# Patient Record
Sex: FEMALE
[De-identification: names, ages, dates, MRNs, and addresses within clinical notes are randomized per-mention and may not be internally consistent; named-entity substitution may affect disease eponyms.]

---

## 2022-04-05 PROBLEM — Z00.00 ENCOUNTER FOR PREVENTIVE HEALTH EXAMINATION: Status: ACTIVE | Noted: 2022-04-05

## 2022-04-08 ENCOUNTER — APPOINTMENT (OUTPATIENT)
Dept: PAIN MANAGEMENT | Facility: CLINIC | Age: 57
End: 2022-04-08
Payer: COMMERCIAL

## 2022-04-08 PROCEDURE — 99203 OFFICE O/P NEW LOW 30 MIN: CPT

## 2022-04-08 RX ORDER — GABAPENTIN 300 MG
300 TABLET ORAL
Refills: 0 | Status: ACTIVE | COMMUNITY

## 2022-04-08 RX ORDER — DICLOFENAC SODIUM 50 MG/1
50 TABLET, DELAYED RELEASE ORAL
Qty: 60 | Refills: 0 | Status: ACTIVE | COMMUNITY
Start: 2022-04-08 | End: 1900-01-01

## 2022-04-08 NOTE — PHYSICAL EXAM
[Normal Spine curvature] : normal spine curvature [Facet Tenderness] : facet tenderness [Paraspinal Tenderness] : paraspinal tenderness [Normal] : Gait: normal [UE/LE] : Sensory: Intact in bilateral upper & lower extremities [ALL] : dorsalis pedis, posterior tibial, femoral, popliteal, and radial 2+ and symmetric bilaterally [Spinous Process Tenderness] : no spinous process tenderness [Sacroiliac tenderness] : no sacroiliac tenderness [GreaterTrochanteric bursa tenderness] : no greater trochanteric bursa tenderness [Piriformis Tenderness] : no piriformis tenderness [Spurling] : negative Spurling's Test [SLR] : negative straight leg raise [de-identified] : Pain with extension , less with flexion

## 2022-05-11 ENCOUNTER — APPOINTMENT (OUTPATIENT)
Dept: PAIN MANAGEMENT | Facility: AMBULATORY MEDICAL SERVICES | Age: 57
End: 2022-05-11
Payer: COMMERCIAL

## 2022-05-11 PROCEDURE — 64494 INJ PARAVERT F JNT L/S 2 LEV: CPT

## 2022-05-11 PROCEDURE — 64493 INJ PARAVERT F JNT L/S 1 LEV: CPT

## 2022-05-12 ENCOUNTER — APPOINTMENT (OUTPATIENT)
Dept: PAIN MANAGEMENT | Facility: CLINIC | Age: 57
End: 2022-05-12
Payer: COMMERCIAL

## 2022-05-12 PROCEDURE — 99202 OFFICE O/P NEW SF 15 MIN: CPT | Mod: 95

## 2022-05-12 PROCEDURE — 99212 OFFICE O/P EST SF 10 MIN: CPT | Mod: 95

## 2022-05-25 ENCOUNTER — APPOINTMENT (OUTPATIENT)
Dept: PAIN MANAGEMENT | Facility: CLINIC | Age: 57
End: 2022-05-25
Payer: COMMERCIAL

## 2022-05-25 PROCEDURE — 64493 INJ PARAVERT F JNT L/S 1 LEV: CPT | Mod: 50

## 2022-05-25 PROCEDURE — 64494 INJ PARAVERT F JNT L/S 2 LEV: CPT | Mod: 50

## 2022-05-26 ENCOUNTER — APPOINTMENT (OUTPATIENT)
Dept: PAIN MANAGEMENT | Facility: CLINIC | Age: 57
End: 2022-05-26
Payer: COMMERCIAL

## 2022-05-26 PROCEDURE — 99202 OFFICE O/P NEW SF 15 MIN: CPT | Mod: 95

## 2022-05-26 PROCEDURE — 99212 OFFICE O/P EST SF 10 MIN: CPT | Mod: 95

## 2022-05-31 ENCOUNTER — APPOINTMENT (OUTPATIENT)
Dept: PAIN MANAGEMENT | Facility: CLINIC | Age: 57
End: 2022-05-31
Payer: COMMERCIAL

## 2022-05-31 PROCEDURE — 99212 OFFICE O/P EST SF 10 MIN: CPT

## 2022-06-08 ENCOUNTER — APPOINTMENT (OUTPATIENT)
Dept: PAIN MANAGEMENT | Facility: CLINIC | Age: 57
End: 2022-06-08
Payer: COMMERCIAL

## 2022-06-08 PROCEDURE — 64635 DESTROY LUMB/SAC FACET JNT: CPT

## 2022-06-08 PROCEDURE — 64636 DESTROY L/S FACET JNT ADDL: CPT

## 2022-06-15 ENCOUNTER — APPOINTMENT (OUTPATIENT)
Dept: PAIN MANAGEMENT | Facility: CLINIC | Age: 57
End: 2022-06-15

## 2022-06-15 PROCEDURE — 64636 DESTROY L/S FACET JNT ADDL: CPT | Mod: LT,58

## 2022-06-15 PROCEDURE — 64635 DESTROY LUMB/SAC FACET JNT: CPT | Mod: LT,58

## 2022-06-21 ENCOUNTER — APPOINTMENT (OUTPATIENT)
Dept: PAIN MANAGEMENT | Facility: CLINIC | Age: 57
End: 2022-06-21

## 2022-07-08 ENCOUNTER — APPOINTMENT (OUTPATIENT)
Dept: PAIN MANAGEMENT | Facility: CLINIC | Age: 57
End: 2022-07-08

## 2022-07-08 ENCOUNTER — NON-APPOINTMENT (OUTPATIENT)
Age: 57
End: 2022-07-08

## 2022-07-08 PROCEDURE — 99213 OFFICE O/P EST LOW 20 MIN: CPT

## 2022-07-08 NOTE — PHYSICAL EXAM
[Normal] : Well developed, in no acute distress, alert and oriented to person, place and time [Sacroiliac tenderness] : sacroiliac tenderness [Within functional limits and without pain] : within functional limits and without pain [Antalgic] : antalgic [UE/LE] : Motor: 5/5 motor strength in bilateral upper & lower extremities [Facet Tenderness] : no facet tenderness [de-identified] : TTP Right si joint, + won, + gaenlsen and + yeoman on the right, negative SLR  [de-identified] : + leg length discrepancy

## 2022-07-22 ENCOUNTER — APPOINTMENT (OUTPATIENT)
Dept: PAIN MANAGEMENT | Facility: CLINIC | Age: 57
End: 2022-07-22

## 2022-07-22 PROCEDURE — 99212 OFFICE O/P EST SF 10 MIN: CPT | Mod: 95

## 2022-07-22 PROCEDURE — 99202 OFFICE O/P NEW SF 15 MIN: CPT | Mod: 95

## 2022-07-22 RX ORDER — METHYLPREDNISOLONE 4 MG/1
4 TABLET ORAL
Qty: 1 | Refills: 0 | Status: ACTIVE | COMMUNITY
Start: 2022-07-22 | End: 1900-01-01

## 2022-07-22 RX ORDER — TRAMADOL HYDROCHLORIDE 50 MG/1
50 TABLET, COATED ORAL
Qty: 21 | Refills: 1 | Status: ACTIVE | COMMUNITY
Start: 2022-07-22 | End: 1900-01-01

## 2022-08-08 ENCOUNTER — APPOINTMENT (OUTPATIENT)
Dept: PAIN MANAGEMENT | Facility: CLINIC | Age: 57
End: 2022-08-08

## 2022-08-08 PROCEDURE — 99214 OFFICE O/P EST MOD 30 MIN: CPT

## 2022-08-10 NOTE — PHYSICAL EXAM
[Normal muscle bulk without asymmetry] : normal muscle bulk without asymmetry [Within functional limits and without pain] : within functional limits and without pain [UE/LE] : Motor: 5/5 motor strength in bilateral upper & lower extremities [Normal] : Normal affect [Spinous Process Tenderness] : no spinous process tenderness [Facet Tenderness] : no facet tenderness [SLR] : negative straight leg raise [de-identified] : TTP low back  [de-identified] : II-XII intact

## 2022-09-06 ENCOUNTER — APPOINTMENT (OUTPATIENT)
Dept: PAIN MANAGEMENT | Facility: CLINIC | Age: 57
End: 2022-09-06

## 2022-09-06 DIAGNOSIS — M47.812 SPONDYLOSIS W/OUT MYELOPATHY OR RADICULOPATHY, CERVICAL REGION: ICD-10-CM
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
## 2022-09-06 PROCEDURE — 99202 OFFICE O/P NEW SF 15 MIN: CPT | Mod: 95

## 2022-09-06 PROCEDURE — 99212 OFFICE O/P EST SF 10 MIN: CPT | Mod: 95

## 2022-09-07 PROBLEM — M47.812 CERVICAL SPONDYLOSIS: Status: ACTIVE | Noted: 2022-04-08
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
## 2022-09-07 NOTE — PHYSICAL EXAM
[Normal muscle bulk without asymmetry] : normal muscle bulk without asymmetry [Within functional limits and without pain] : within functional limits and without pain [UE/LE] : Motor: 5/5 motor strength in bilateral upper & lower extremities [Normal] : Normal affect [Spinous Process Tenderness] : no spinous process tenderness [Facet Tenderness] : no facet tenderness [SLR] : negative straight leg raise [de-identified] : TTP low back  [de-identified] : II-XII intact

## 2023-01-23 ENCOUNTER — APPOINTMENT (OUTPATIENT)
Dept: PAIN MANAGEMENT | Facility: CLINIC | Age: 58
End: 2023-01-23
Payer: COMMERCIAL

## 2023-01-23 VITALS
DIASTOLIC BLOOD PRESSURE: 75 MMHG | WEIGHT: 155 LBS | SYSTOLIC BLOOD PRESSURE: 130 MMHG | HEIGHT: 62 IN | OXYGEN SATURATION: 98 % | BODY MASS INDEX: 28.52 KG/M2 | HEART RATE: 84 BPM

## 2023-01-23 PROCEDURE — 99214 OFFICE O/P EST MOD 30 MIN: CPT

## 2023-01-23 NOTE — HISTORY OF PRESENT ILLNESS
[7] : 3. What number best describes how, during the past week, pain has interfered with your general activity? 7/10 pain [FreeTextEntry2] : 21

## 2023-01-23 NOTE — PHYSICAL EXAM
[Normal muscle bulk without asymmetry] : normal muscle bulk without asymmetry [Within functional limits and without pain] : within functional limits and without pain [UE/LE] : Motor: 5/5 motor strength in bilateral upper & lower extremities [Normal] : Normal affect [Spinous Process Tenderness] : no spinous process tenderness [Facet Tenderness] : no facet tenderness [SLR] : negative straight leg raise [SI Provacative Testing] : negative SI Provacative Testing [de-identified] : TTP low back  [de-identified] : II-XII intact

## 2023-02-01 ENCOUNTER — APPOINTMENT (OUTPATIENT)
Dept: PAIN MANAGEMENT | Facility: CLINIC | Age: 58
End: 2023-02-01
Payer: COMMERCIAL

## 2023-02-01 PROCEDURE — 64483 NJX AA&/STRD TFRM EPI L/S 1: CPT | Mod: RT

## 2023-02-01 PROCEDURE — 64484 NJX AA&/STRD TFRM EPI L/S EA: CPT | Mod: RT

## 2023-02-21 ENCOUNTER — APPOINTMENT (OUTPATIENT)
Dept: PAIN MANAGEMENT | Facility: CLINIC | Age: 58
End: 2023-02-21
Payer: COMMERCIAL

## 2023-02-21 DIAGNOSIS — G96.191 PERINEURAL CYST: ICD-10-CM

## 2023-02-21 DIAGNOSIS — M53.3 SACROCOCCYGEAL DISORDERS, NOT ELSEWHERE CLASSIFIED: ICD-10-CM

## 2023-02-21 PROCEDURE — 99214 OFFICE O/P EST MOD 30 MIN: CPT

## 2023-02-23 PROBLEM — M53.3 PAIN OF RIGHT SACROILIAC JOINT: Status: ACTIVE | Noted: 2022-07-08

## 2023-02-23 PROBLEM — G96.191 TARLOV CYSTS: Status: ACTIVE | Noted: 2022-04-08

## 2023-02-24 NOTE — PHYSICAL EXAM
[Normal muscle bulk without asymmetry] : normal muscle bulk without asymmetry [Within functional limits and without pain] : within functional limits and without pain [SI Provacative Testing] : positive SI Provacative Testing [UE/LE] : Motor: 5/5 motor strength in bilateral upper & lower extremities [Normal] : Normal affect [Spinous Process Tenderness] : no spinous process tenderness [Facet Tenderness] : no facet tenderness [SLR] : negative straight leg raise [de-identified] : TTP low back  [de-identified] : II-XII intact

## 2023-03-08 ENCOUNTER — APPOINTMENT (OUTPATIENT)
Dept: PAIN MANAGEMENT | Facility: CLINIC | Age: 58
End: 2023-03-08
Payer: COMMERCIAL

## 2023-03-08 PROCEDURE — 27096 INJECT SACROILIAC JOINT: CPT | Mod: RT

## 2023-03-31 ENCOUNTER — APPOINTMENT (OUTPATIENT)
Dept: PAIN MANAGEMENT | Facility: CLINIC | Age: 58
End: 2023-03-31
Payer: COMMERCIAL

## 2023-03-31 DIAGNOSIS — M51.26 OTHER INTERVERTEBRAL DISC DISPLACEMENT, LUMBAR REGION: ICD-10-CM

## 2023-03-31 DIAGNOSIS — M54.16 RADICULOPATHY, LUMBAR REGION: ICD-10-CM

## 2023-03-31 DIAGNOSIS — M47.816 SPONDYLOSIS W/OUT MYELOPATHY OR RADICULOPATHY, LUMBAR REGION: ICD-10-CM

## 2023-03-31 DIAGNOSIS — M51.9 UNSPECIFIED THORACIC, THORACOLUMBAR AND LUMBOSACRAL INTERVERTEBRAL DISC DISORDER: ICD-10-CM

## 2023-03-31 DIAGNOSIS — M48.061 SPINAL STENOSIS, LUMBAR REGION WITHOUT NEUROGENIC CLAUDICATION: ICD-10-CM

## 2023-03-31 PROCEDURE — 99213 OFFICE O/P EST LOW 20 MIN: CPT

## 2023-04-02 PROBLEM — M48.061 LUMBAR SPINAL STENOSIS: Status: ACTIVE | Noted: 2023-01-23

## 2023-04-02 PROBLEM — M51.26 LUMBAR DISC HERNIATION: Status: ACTIVE | Noted: 2023-01-23

## 2023-04-02 PROBLEM — M47.816 LUMBAR FACET ARTHROPATHY: Status: ACTIVE | Noted: 2022-04-08

## 2023-04-02 PROBLEM — M51.9 LUMBAR DISC DISEASE: Status: ACTIVE | Noted: 2022-04-08

## 2023-04-02 NOTE — PHYSICAL EXAM
[Normal muscle bulk without asymmetry] : normal muscle bulk without asymmetry [Within functional limits and without pain] : within functional limits and without pain [SI Provacative Testing] : positive SI Provacative Testing [UE/LE] : Motor: 5/5 motor strength in bilateral upper & lower extremities [Normal] : Normal affect [Facet Tenderness] : no facet tenderness [Spinous Process Tenderness] : no spinous process tenderness [SLR] : negative straight leg raise [de-identified] : TTP low back  [de-identified] : II-XII intact

## 2023-10-01 PROBLEM — M54.16 LUMBAR RADICULAR PAIN: Status: ACTIVE | Noted: 2023-01-23
